# Patient Record
Sex: FEMALE | NOT HISPANIC OR LATINO | URBAN - METROPOLITAN AREA
[De-identification: names, ages, dates, MRNs, and addresses within clinical notes are randomized per-mention and may not be internally consistent; named-entity substitution may affect disease eponyms.]

---

## 2018-08-30 ENCOUNTER — OFFICE VISIT (OUTPATIENT)
Dept: SURGERY | Facility: CLINIC | Age: 33
End: 2018-08-30
Attending: SURGERY
Payer: MEDICAID

## 2018-08-30 VITALS — HEIGHT: 59 IN | WEIGHT: 129 LBS | BODY MASS INDEX: 26 KG/M2

## 2018-08-30 DIAGNOSIS — O00.01 ABDOMINAL PREGNANCY WITH INTRAUTERINE PREGNANCY: Primary | ICD-10-CM

## 2018-08-30 PROCEDURE — 99202 OFFICE O/P NEW SF 15 MIN: CPT | Mod: ZP | Performed by: SURGERY

## 2018-08-30 PROCEDURE — G0463 HOSPITAL OUTPT CLINIC VISIT: HCPCS | Mod: ZF

## 2018-08-30 ASSESSMENT — PAIN SCALES - GENERAL: PAINLEVEL: NO PAIN (0)

## 2018-08-30 NOTE — MR AVS SNAPSHOT
"              After Visit Summary   2018    Maximiliano Edmond    MRN: [id number]           Patient Information     Date Of Birth          1985        Visit Information        Provider Department      2018 4:00 PM Nguyễn Last MD Peds Surgery [facility] PEDIATRIC GENERAL SURGERY      Today's Diagnoses     Abdominal pregnancy with intrauterine pregnancy    -  1       Follow-ups after your visit        Follow-up notes from your care team     Return if symptoms worsen or fail to improve.      Who to contact     Please call your clinic at [phone] to:    Ask questions about your health    Make or cancel appointments    Discuss your medicines    Learn about your test results    Speak to your doctor            Additional Information About Your Visit        MyChart Information     [employer] is an electronic gateway that provides easy, online access to your medical records. With [employer], you can request a clinic appointment, read your test results, renew a prescription or communicate with your care team.     To sign up for [employer] visit the website at [email or web address]   You will be asked to enter the access code listed below, as well as some personal information. Please follow the directions to create your username and password.     Your access code is: [email or web address]  Expires: 2018 10:37 AM     Your access code will  in 90 days. If you need help or a new code, please contact your [facility] or call [phone] for assistance.        Care EveryWhere ID     This is your Care EveryWhere ID. This could be used by other organizations to access your [facility] medical records  [id number]        Your Vitals Were     Height BMI (Body Mass Index)                1.499 m (4' 11\") 26.05 kg/m2           Blood Pressure from Last 3 Encounters:   No data found for BP    Weight from Last 3 Encounters:   18 58.5 kg (129 lb)              Today, you had the following     No " orders found for display       Primary Care Provider Office Phone # Fax #    Oralia Eder Kwan [phone]       [address] [facility] [address]        Equal Access to Services     KONRAD AGUSTIN : Hadii aad ku hadceceo Soomaali, waaxda luqadaha, qaybta kaalmada adeegyada, waxay idiin hayaan aderamsey khsunilrose yan . So [facility] [phone].    ATENCIÓN: Si habla español, tiene a holman disposición servicios gratuitos de asistencia lingüística. Llame al [phone].    We comply with applicable federal civil rights laws and [place] laws. We do not discriminate on the basis of race, color, national origin, age, disability, sex, sexual orientation, or gender identity.            Thank you!     Thank you for choosing PEDS SURGERY  for your care. Our goal is always to provide you with excellent care. Hearing back from our patients is one way we can continue to improve our services. Please take a few minutes to complete the written survey that you may receive in the mail after your visit with us. Thank you!             Your Updated Medication List - Protect others around you: Learn how to safely use, store and throw away your medicines at www.disposemymeds.org.      Notice  As of 8/30/2018 11:59 PM    You have not been prescribed any medications.

## 2018-08-30 NOTE — NURSING NOTE
"[facility] [[id number]]  Chief Complaint   Patient presents with     Consult     pelvic cyst     Initial Ht 4' 11\" (149.9 cm)  Wt 129 lb (58.5 kg)  BMI 26.05 kg/m2 Estimated body mass index is 26.05 kg/(m^2) as calculated from the following:    Height as of this encounter: 4' 11\" (149.9 cm).    Weight as of this encounter: 129 lb (58.5 kg).  Medication Reconciliation: complete   Linette Lauren LPN      "

## 2018-08-30 NOTE — PROGRESS NOTES
2018             Oralia Kwan MD   [facility]    [address] [place] [address]      RE: Maximiliano Edmond   MRN: [id number]   : 1985      Dear Dr. Kwan:       It was a pleasure to see your patient Maximiliano Edmond here at the [facility] for a prenatal consultation regarding the upcoming delivery of her daughter, who was noted to have a cyst in the abdomen on a recent prenatal screening.      As you recall, Ms. Edmond is an otherwise healthy, 33-year-old female who is expecting her 6th child.  During this normal pregnancy on her prenatal screening, she was found to have a roughly 2 x 3 cm cyst in the right mid abdomen above the kidney and near the liver.      I had a very pleasant 20 minute visit with her, of which 100% was spent in counseling and discussion related to the expected potential diagnoses for this cyst.  This would include a standard mesenteric cyst, a potential duplication cyst or even a renal cyst based off the kidney.  Technically, it could be a choledochal cyst, but it is unlikely at the current age of the child as they typically do not have bile flow, but it is possible.      We went over all of the possible  outcomes and treatment algorithms for each of these potential diagnoses.  I am pleased that the child seems to be growing well.  My recommendation would be to obtain a complete abdominal ultrasound at the time of birth and an abdominal x-ray with a good exam to help narrow in the diagnosis and potential definitive treatments.      She is aware that the child may require an operation sometime after birth, or we may be able to monitor it for a few weeks or months.      Again, thank you very much for allowing us to participate in her care.  I look forward to seeing her and her child after delivery.      We did discuss potential delivery sites.  I would leave that up to you and your judgment on if she potentially  would want to be  from the infant if the child needed to come here to [facility] for care or treatment shortly after birth.      Sincerely,      Nguyễn Last MD

## 2018-08-30 NOTE — LETTER
2018      RE: Maximiliano Edmond  [address]  [place] [address]       2018             Oralia Kwan MD   [facility]    [address] [address]      RE: Maximiliano Edmond   MRN: [id number]   : 1985      Dear Dr. Kwan:       It was a pleasure to see your patient Maximiliano Edmond here at the [facility] for a prenatal consultation regarding the upcoming delivery of her daughter, who was noted to have a cyst in the abdomen on a recent prenatal screening.      As you recall, Ms. Edmond is an otherwise healthy, 33-year-old female who is expecting her 6th child.  During this normal pregnancy on her prenatal screening, she was found to have a roughly 2 x 3 cm cyst in the right mid abdomen above the kidney and near the liver.      I had a very pleasant 20 minute visit with her, of which 100% was spent in counseling and discussion related to the expected potential diagnoses for this cyst.  This would include a standard mesenteric cyst, a potential duplication cyst or even a renal cyst based off the kidney.  Technically, it could be a choledochal cyst, but it is unlikely at the current age of the child as they typically do not have bile flow, but it is possible.      We went over all of the possible  outcomes and treatment algorithms for each of these potential diagnoses.  I am pleased that the child seems to be growing well.  My recommendation would be to obtain a complete abdominal ultrasound at the time of birth and an abdominal x-ray with a good exam to help narrow in the diagnosis and potential definitive treatments.      She is aware that the child may require an operation sometime after birth, or we may be able to monitor it for a few weeks or months.      Again, thank you very much for allowing us to participate in her care.  I look forward to seeing her and her child after delivery.      We did discuss potential delivery  sites.  I would leave that up to you and your judgment on if she potentially would want to be  from the infant if the child needed to come here to [facility] for care or treatment shortly after birth.      Sincerely,      Nguyễn Last MD